# Patient Record
Sex: FEMALE | Race: WHITE | Employment: FULL TIME | URBAN - METROPOLITAN AREA
[De-identification: names, ages, dates, MRNs, and addresses within clinical notes are randomized per-mention and may not be internally consistent; named-entity substitution may affect disease eponyms.]

---

## 2017-04-04 PROBLEM — G56.22 ULNAR NEUROPATHY OF LEFT UPPER EXTREMITY: Status: ACTIVE | Noted: 2017-04-04

## 2018-08-23 ENCOUNTER — OFFICE VISIT (OUTPATIENT)
Dept: PHYSICAL MEDICINE AND REHAB | Age: 53
End: 2018-08-23
Payer: COMMERCIAL

## 2018-08-23 VITALS
DIASTOLIC BLOOD PRESSURE: 89 MMHG | TEMPERATURE: 98.7 F | SYSTOLIC BLOOD PRESSURE: 132 MMHG | HEIGHT: 61 IN | HEART RATE: 81 BPM | WEIGHT: 158 LBS | BODY MASS INDEX: 29.83 KG/M2

## 2018-08-23 DIAGNOSIS — G56.01 CARPAL TUNNEL SYNDROME OF RIGHT WRIST: ICD-10-CM

## 2018-08-23 DIAGNOSIS — G56.01 CARPAL TUNNEL SYNDROME OF RIGHT WRIST: Primary | ICD-10-CM

## 2018-08-23 PROCEDURE — 20526 THER INJECTION CARP TUNNEL: CPT | Performed by: PHYSICAL MEDICINE & REHABILITATION

## 2018-08-23 PROCEDURE — 76942 ECHO GUIDE FOR BIOPSY: CPT | Performed by: PHYSICAL MEDICINE & REHABILITATION

## 2018-08-23 RX ORDER — BUPIVACAINE HYDROCHLORIDE 2.5 MG/ML
1 INJECTION, SOLUTION INFILTRATION; PERINEURAL ONCE
Status: COMPLETED | OUTPATIENT
Start: 2018-08-23 | End: 2018-08-23

## 2018-08-23 RX ORDER — TRIAMCINOLONE ACETONIDE 40 MG/ML
40 INJECTION, SUSPENSION INTRA-ARTICULAR; INTRAMUSCULAR ONCE
Status: COMPLETED | OUTPATIENT
Start: 2018-08-23 | End: 2018-08-23

## 2018-08-23 RX ADMIN — TRIAMCINOLONE ACETONIDE 40 MG: 40 INJECTION, SUSPENSION INTRA-ARTICULAR; INTRAMUSCULAR at 11:02

## 2018-08-23 RX ADMIN — BUPIVACAINE HYDROCHLORIDE 2.5 MG: 2.5 INJECTION, SOLUTION INFILTRATION; PERINEURAL at 11:01

## 2018-08-23 NOTE — PROGRESS NOTES
Standing Expiration Date:   8/23/2019    OH INJECT CARPAL TUNNEL       Orders Placed This Encounter   Medications    triamcinolone acetonide (KENALOG-40) injection 40 mg    bupivacaine (MARCAINE) 0.25 % injection 2.5 mg     PROCEDURE NOTE:   After explaining the indications, risks, benefits and alternatives of a Right carpal tunnel injection, the patient agreed to proceed. Permit wwas signed and scanned into the media. The patient was placed in the seated position. The skin on the volar wrist was prepared with chloraprep. Ethyl Chloride vapocoolant spray was used for local anesthesia. Using an aseptic, no touch technique, a 25 gauge, 5/8\" needle with 1 cc of bupivicaine 0.25% and 1 cc of Kenalog 40 mg/cc was directed into the carpal tunnel using ultrasound guidance  After negative aspiration, the medication was injected. Adequate hemostasis was obtained and a bandage applied to the injection site. The patient tolerated the procedure well and was educated in post injection care. There was post injection reduction in pain. The images are uploaded separately in the EMR. The patient was educated about the diagnosis, prognosis, indications, risks and benefits of treatment. An opportunity to ask questions was given to the patient and questions were answered. The patient agreed to proceed with the recommended treatment as described above. Follow up prn       Thank you for allowing me to participate in the care of your patient. Luis Alfredo Sneed D.O., P.T.   Board Certified Physical Medicine and Rehabilitation  Board Certified Electrodiagnostic Medicine

## 2022-05-10 ENCOUNTER — HOSPITAL ENCOUNTER (OUTPATIENT)
Dept: GENERAL RADIOLOGY | Age: 57
Discharge: HOME | End: 2022-05-12
Payer: COMMERCIAL

## 2022-05-10 ENCOUNTER — HOSPITAL ENCOUNTER (OUTPATIENT)
Age: 57
Discharge: HOME | End: 2022-05-12
Payer: COMMERCIAL

## 2022-05-10 DIAGNOSIS — R06.2 WHEEZING: ICD-10-CM

## 2022-05-10 DIAGNOSIS — F17.200 CURRENT SMOKER: ICD-10-CM

## 2022-05-10 PROCEDURE — 71046 X-RAY EXAM CHEST 2 VIEWS: CPT

## 2025-01-26 ENCOUNTER — HOSPITAL ENCOUNTER (EMERGENCY)
Age: 60
Discharge: HOME | End: 2025-01-26
Attending: FAMILY MEDICINE
Payer: COMMERCIAL

## 2025-01-26 VITALS
RESPIRATION RATE: 16 BRPM | HEART RATE: 94 BPM | TEMPERATURE: 98.3 F | OXYGEN SATURATION: 98 % | SYSTOLIC BLOOD PRESSURE: 140 MMHG | DIASTOLIC BLOOD PRESSURE: 82 MMHG

## 2025-01-26 DIAGNOSIS — J06.9 UPPER RESPIRATORY TRACT INFECTION, UNSPECIFIED TYPE: Primary | ICD-10-CM

## 2025-01-26 PROCEDURE — 99283 EMERGENCY DEPT VISIT LOW MDM: CPT

## 2025-01-26 RX ORDER — AZITHROMYCIN 250 MG/1
TABLET, FILM COATED ORAL
Qty: 6 TABLET | Refills: 0 | Status: SHIPPED | OUTPATIENT
Start: 2025-01-26

## 2025-01-26 ASSESSMENT — PAIN - FUNCTIONAL ASSESSMENT: PAIN_FUNCTIONAL_ASSESSMENT: NONE - DENIES PAIN

## 2025-01-26 NOTE — ED PROVIDER NOTES
Urgent Care Encounter       Patient: Orquidea Rob  MRN: [id number]  : 1965  Date of Evaluation: 2025  ED Provider: Ami Dwyer MD    Chief Complaint       Chief Complaint   Patient presents with    Cough     Cough, congestion and runny nose that started 2 days ago.     BAL Rob is a 60 y.o. female who presents to the [facility] (Urgent care Facility)     HPI: Patient states that symptoms of runny nose, cough started 2 days ago, patient does have a past medical history of hypertension, O2 saturations are WNL, no shortness of breath, does not remember a sick contact but symptoms have not abated and have worsened. Symptoms reported are fatigue and malaise due to this acute development.   No Fever reported   No headache reported indicating acute sinusitis.     -Patient's lungs are CLEAR on auscultation and pt has no fevers indicating likely that patient does not have a pneumonia plus patient vitals are stable.   -I have advised patient that acute bronchitis is viral in nature most times due to viruses like Influenza A & B, Parainfluenza, RSV, Adenovirus, Rhinovirus, and typically NOT bacterial etiology (bacteria like Mycoplasma Pneumonia, Chlamydia Pneumonia, Moraxella, Bordetella pertussis, Legionella Pneumophila, Haemophiles Influenzae but this could be a possibility at times.  -I have strongly advised patient to return for an evaluation if patient worsens symptoms, develops fevers or SOB, worsening headache, worsening cough and may need imaging at that point of time and a different course of antibiotic and full evaluation of vital signs. Patient is in FULL understanding of this and agrees with the plan.     ROS:     At least 6 systems reviewed and otherwise acutely negative except as in the [place].  Review of Systems      Past History     Past Medical History:   Diagnosis Date    Anxiety     Hyperlipemia     Hypertension      Past Surgical History: